# Patient Record
Sex: MALE | HISPANIC OR LATINO | URBAN - NONMETROPOLITAN AREA
[De-identification: names, ages, dates, MRNs, and addresses within clinical notes are randomized per-mention and may not be internally consistent; named-entity substitution may affect disease eponyms.]

---

## 2020-08-31 ENCOUNTER — IMPORTED ENCOUNTER (OUTPATIENT)
Dept: URBAN - NONMETROPOLITAN AREA CLINIC 1 | Facility: CLINIC | Age: 40
End: 2020-08-31

## 2020-08-31 PROBLEM — H52.03: Noted: 2020-08-31

## 2020-08-31 PROBLEM — H52.4: Noted: 2020-08-31

## 2020-08-31 PROBLEM — H16.223: Noted: 2020-08-31

## 2020-08-31 PROCEDURE — S0620 ROUTINE OPHTHALMOLOGICAL EXA: HCPCS

## 2020-08-31 NOTE — PATIENT DISCUSSION
Simple Hyperopia OU w/Presbyopia-  discussed findings w/patient-  new spectacle Rx issued-  continue to monitor yearly or prnDES OU-  discussed findings w/patient-  start AT's at least BID OU-  continue to monitor yearly or prn; 's Notes: MR 8/31/2020DFE 8/31/2020

## 2021-05-13 NOTE — PATIENT DISCUSSION
patient is interested having Botox injections here. Advised patient to return in [place] for evaluation for further injections with Dr. Giacomo Downing.

## 2021-05-13 NOTE — PATIENT DISCUSSION
Advised patient that she might be ok with her vision but, the progressing cataracts are what is making light so problematic.

## 2021-05-24 NOTE — PATIENT DISCUSSION
5/13/21 Patient deferred refraction today.
Advised patient that she might be ok with her vision but, the progressing cataracts are what is making light so problematic.
Bryon Marie in [facility] [place]  [place]
Discussed condition and exacerbating conditions/situations (e.g., dry/arid environments, overhead fans, air conditioners, side effect of medications).
Discussed potential cataract surgery in the near future.  Patient to contact us when she is ready to proceed with cataract surgery.
Hx Botox [id number] with  in [facility]. Pt is unsure of Doctors name or how many units of botox were administered.
Monitor at this time.
Pathogenesis and natural history discussed and patient's questions answered.
Patient desires to proceed with treatment.
RV in 3 months, sooner with increased symptoms.
Recommend starting with lower doze and see if it helps symptoms. Will increase PRN.
Recommended use of artificial tears TID and artificial tear gel at bedtime.
The cataracts are responsible for the patient's sensitivity to light.
The risks, benefits, and alternatives to treatment with Botox® were discussed.
No

## 2021-05-24 NOTE — PROCEDURE NOTE: CLINICAL
PROCEDURE NOTE: Botox Injection Prior to treatment, the risks/benefits/alternatives were discussed. The patient wished to proceed with procedure. Total Units Used: 100. Units / 60 injected. Remainder Wasted. Refer to template for location and amounts of injections. Patient tolerated procedure well. There were no complications. Post procedure instructions given. Mary Lou Kohler

## 2021-08-20 NOTE — PROCEDURE NOTE: CLINICAL
PROCEDURE NOTE: Botox Injection OU. Diagnosis: Blepharospasm. Prior to treatment, the risks/benefits/alternatives were discussed. The patient wished to proceed with procedure. Total Units Used: 100. 60 Units injected . Remainder wasted. Carrie Walsh Refer to template for location and amounts of injections. Patient tolerated procedure well. There were no complications. Post procedure instructions given. Carrie Walsh

## 2021-08-20 NOTE — PATIENT DISCUSSION
Hx Botox [id number] with  in [facility]. Pt is unsure of Doctors name or how many units of botox were administered.

## 2021-08-20 NOTE — PATIENT DISCUSSION
Vaughn Villanueva in [facility] [place]  GU[place]

## 2021-10-04 ENCOUNTER — IMPORTED ENCOUNTER (OUTPATIENT)
Dept: URBAN - NONMETROPOLITAN AREA CLINIC 1 | Facility: CLINIC | Age: 41
End: 2021-10-04

## 2021-10-04 PROCEDURE — S0621 ROUTINE OPHTHALMOLOGICAL EXA: HCPCS

## 2021-10-04 NOTE — PATIENT DISCUSSION
Simple Hyperopia OU w/Presbyopia-  discussed findings w/patient-  new spectacle Rx issued-  continue to monitor yearly or prnDES OU-  discussed findings w/patient-  stable findings at this time-  continue AT's at least BID OU-  continue to monitor yearly or prn; 's Notes:  10/4/2021DFE 10/4/2021

## 2021-12-03 NOTE — PATIENT DISCUSSION
Sheri Jane in [facility] [place]  [place]

## 2021-12-03 NOTE — PATIENT DISCUSSION
Hx Botox [id number] with  in [facility]. Pt is unsure of Doctors name or how many units of botox were administered.

## 2021-12-03 NOTE — PROCEDURE NOTE: CLINICAL
PROCEDURE NOTE: Botox Injection OU. Diagnosis: Blepharospasm. Prior to treatment, the risks/benefits/alternatives were discussed. The patient wished to proceed with procedure. Dilution (ml): 0.9% SODIUM CHLORIDE LOT# -[id number]. Expiration Date: 12/1/2022. Total Units Used: 100. Units / 0.1ml: SODIUM CHLORIDE. 67.5 UNITS INJECTED, REMAINDER WASTED. Refer to template for location and amounts of injections. Patient tolerated procedure well. There were no complications. Post procedure instructions given. Raphael Pisano

## 2022-04-01 NOTE — PROCEDURE NOTE: CLINICAL
PROCEDURE NOTE: Botox Injection OU. Diagnosis: Blepharospasm. Prior to treatment, the risks/benefits/alternatives were discussed. The patient wished to proceed with procedure. Refer to template for location and amounts of injections. Patient tolerated procedure well. There were no complications. Post procedure instructions given. Camila Noriega

## 2022-04-01 NOTE — PATIENT DISCUSSION
Hx Botox [id number] with  in [facility]. Pt is unsure of Doctors name or how many units of botox were administered but remember she did not have a good reaction .

## 2022-04-15 ASSESSMENT — VISUAL ACUITY
OU_CC: 20/20
OS_CC: 20/25
OD_SC: 20/50
OD_CC: 20/25
OS_PH: 20/20
OD_PH: 20/20
OS_SC: 20/50

## 2022-04-15 ASSESSMENT — TONOMETRY
OD_IOP_MMHG: 14
OS_IOP_MMHG: 16
OS_IOP_MMHG: 14
OD_IOP_MMHG: 16

## 2022-08-05 NOTE — PROCEDURE NOTE: CLINICAL
PROCEDURE NOTE: Botox Injection OU. Diagnosis: Blepharospasm. Prior to treatment, the risks/benefits/alternatives were discussed. The patient wished to proceed with procedure. A time out to confirm the patient, site, and procedure has been achieved. The site has been marked. Refer to template for location and amounts of injections. Total Units Used: 100 , Total units injected: 67.5 , remainder wasted . Patient tolerated procedure well. There were no complications. Post procedure instructions given. Eliezer Chamorro

## 2022-08-05 NOTE — PATIENT DISCUSSION
Hx Botox [id number] with  in [facility]. Pt is unsure of Doctors name or how many units of botox were administered but remember she did not have a good reaction .

## 2022-08-05 NOTE — PATIENT DISCUSSION
Discussed potential cataract surgery in the near future.  Patient to contact us when she is ready to proceed with cataract surgery. My signature below certifies that the above stated patient is homebound and upon completion of the Face-To-Face encounter, has the need for intermittent skilled nursing, physical therapy and/or speech or occupational therapy services in their home for their current diagnosis as outlined in their initial plan of care. These services will continue to be monitored by myself or another physician.

## 2022-08-16 NOTE — PATIENT DISCUSSION
"Mainly bruising LL due to recent fall . Pt was concerned she may have ""ruined"" her botox procedure. Good sensation of cheek and good EOM

## 2022-09-08 NOTE — PATIENT DISCUSSION
Pt stated very nervous about surgery because of what her family member and friend experienced when they had their procedures . Explained every pt reacts different to surgery . Pt would like to discuss with her daughter before scheduling .

## 2022-09-08 NOTE — PATIENT DISCUSSION
MAC OCT Performed today . Retinal tear and detachment warning symptoms reviewed and patient instructed to call immediately if increasing floaters, flashes, or decreasing peripheral vision.

## 2022-09-08 NOTE — PATIENT DISCUSSION
Patient’s visual symptoms will NOT be adequately improved by a tolerable change in glasses or contacts. Counseled patient that removal of cataract is expected to improve vision. The patient feels that the cataract is significantly impacting daily activities and has elected to have cataract surgery. The risks, benefits, and alternatives to surgery were discussed. The patient elects to proceed with surgery.

## 2022-11-11 NOTE — PATIENT DISCUSSION
The risks, benefits, and alternatives to treatment with Botox® were discussed. 67.5 units injected .

## 2022-11-11 NOTE — PATIENT DISCUSSION
Pt reinstated she is very nervous about surgery because of what her family member and friend experienced when they had their procedures . Advised to bring her daughter at next appointment .

## 2022-11-11 NOTE — PROCEDURE NOTE: CLINICAL
PROCEDURE NOTE: Botox Injection OU. Diagnosis: Blepharospasm. Prior to treatment, the risks/benefits/alternatives were discussed. The patient wished to proceed with procedure. A time out to confirm the patient, site, and procedure has been achieved. The site has been marked. Refer to template for location and amounts of injections. Total Units Used: 100 , 67.5 injected and remainder wasted . Patient tolerated procedure well. There were no complications. Post procedure instructions given. Christina Murillo

## 2022-11-11 NOTE — PATIENT DISCUSSION
Pathogenesis and natural history discussed and patient's questions answered. Previously Declined (within the last year)

## 2023-11-03 ENCOUNTER — COMPREHENSIVE EXAM (OUTPATIENT)
Dept: URBAN - NONMETROPOLITAN AREA CLINIC 4 | Facility: CLINIC | Age: 43
End: 2023-11-03

## 2023-11-03 DIAGNOSIS — H52.4: ICD-10-CM

## 2023-11-03 DIAGNOSIS — H52.03: ICD-10-CM

## 2023-11-03 PROCEDURE — S0621AEC ROUTINE OPH EXAM INCLUDES REF/ EST PATIENT

## 2023-11-03 ASSESSMENT — VISUAL ACUITY
OS_SC: 20/25
OD_SC: 20/20-1

## 2023-11-03 ASSESSMENT — TONOMETRY
OD_IOP_MMHG: 12
OS_IOP_MMHG: 14